# Patient Record
Sex: FEMALE | Race: WHITE | Employment: FULL TIME | URBAN - METROPOLITAN AREA
[De-identification: names, ages, dates, MRNs, and addresses within clinical notes are randomized per-mention and may not be internally consistent; named-entity substitution may affect disease eponyms.]

---

## 2021-09-13 ENCOUNTER — APPOINTMENT (OUTPATIENT)
Dept: GENERAL RADIOLOGY | Age: 21
End: 2021-09-13
Attending: PHYSICIAN ASSISTANT

## 2021-09-13 ENCOUNTER — HOSPITAL ENCOUNTER (EMERGENCY)
Age: 21
Discharge: HOME | End: 2021-09-13
Attending: EMERGENCY MEDICINE

## 2021-09-13 VITALS
HEIGHT: 64 IN | TEMPERATURE: 98.7 F | SYSTOLIC BLOOD PRESSURE: 123 MMHG | RESPIRATION RATE: 15 BRPM | OXYGEN SATURATION: 100 % | BODY MASS INDEX: 18.78 KG/M2 | WEIGHT: 110 LBS | DIASTOLIC BLOOD PRESSURE: 88 MMHG | HEART RATE: 80 BPM

## 2021-09-13 DIAGNOSIS — R07.9 CHEST PAIN, UNSPECIFIED TYPE: ICD-10-CM

## 2021-09-13 DIAGNOSIS — R07.89 CHEST WALL PAIN: ICD-10-CM

## 2021-09-13 DIAGNOSIS — N64.4 BREAST PAIN: Primary | ICD-10-CM

## 2021-09-13 LAB — HCG UR QL: NEGATIVE

## 2021-09-13 PROCEDURE — 71046 X-RAY EXAM CHEST 2 VIEWS: CPT

## 2021-09-13 PROCEDURE — 81025 URINE PREGNANCY TEST: CPT

## 2021-09-13 PROCEDURE — 93005 ELECTROCARDIOGRAM TRACING: CPT | Performed by: PHYSICIAN ASSISTANT

## 2021-09-13 PROCEDURE — 99283 EMERGENCY DEPT VISIT LOW MDM: CPT

## 2021-09-13 RX ORDER — NAPROXEN SODIUM 550 MG/1
550 TABLET ORAL 2 TIMES DAILY WITH MEALS
Qty: 20 TABLET | Refills: 0 | Status: SHIPPED | OUTPATIENT
Start: 2021-09-13 | End: 2021-09-23

## 2021-09-13 RX ORDER — NORETHINDRONE ACETATE AND ETHINYL ESTRADIOL 1.5-30(21)
1 KIT ORAL DAILY
COMMUNITY

## 2021-09-13 NOTE — ED TRIAGE NOTES
Pt states she has a dull chest pain med chest on sternum and that her breast are sore/  Pt states it feels like pressure

## 2021-09-13 NOTE — ED PROVIDER NOTES
48-year-old female who is otherwise healthy comes in with concern for chest pain for the past several days. Patient reports she has also had bilateral breast pain for about 2 or 3 weeks but developed pain in the middle of her chest over the past couple days. She has no cough or shortness of breath. Reports the pain feels like a pressure in the middle of her sternum and there are no alleviating or exacerbating factors. She denies fevers, hemoptysis or leg swelling. History reviewed. No pertinent past medical history. No past surgical history on file. History reviewed. No pertinent family history. Social History     Socioeconomic History    Marital status: SINGLE     Spouse name: Not on file    Number of children: Not on file    Years of education: Not on file    Highest education level: Not on file   Occupational History    Not on file   Tobacco Use    Smoking status: Not on file   Substance and Sexual Activity    Alcohol use: Not on file    Drug use: Not on file    Sexual activity: Not on file   Other Topics Concern    Not on file   Social History Narrative    Not on file     Social Determinants of Health     Financial Resource Strain:     Difficulty of Paying Living Expenses:    Food Insecurity:     Worried About Running Out of Food in the Last Year:     [address] in the Last Year:    Transportation Needs:     Lack of Transportation (Medical):      Lack of Transportation (Non-Medical):    Physical Activity:     Days of Exercise per Week:     Minutes of Exercise per Session:    Stress:     Feeling of Stress :    Social Connections:     Frequency of Communication with Friends and Family:     Frequency of Social Gatherings with Friends and Family:     Attends [employer]ian[employer] Services:     Active Member of Clubs or Organizations:     Attends Club or Organization Meetings:     Marital Status:    Intimate Partner Violence:     Fear of Current or Ex-Partner:     Emotionally Abused:     Physically Abused:     Sexually Abused: ALLERGIES: Patient has no known allergies. Review of Systems   Constitutional: Negative for chills and fever. Respiratory: Negative for cough and shortness of breath. Cardiovascular: Positive for chest pain. Negative for palpitations and leg swelling. Gastrointestinal: Negative for abdominal pain, nausea and vomiting. Genitourinary: Negative for dysuria, frequency and pelvic pain. Skin: Negative for color change. All other systems reviewed and are negative. Vitals:    09/13/21 1648   BP: 123/88   Pulse: 80   Resp: 15   Temp: 98.7 °F (37.1 °C)   SpO2: 100%   Weight: 49.9 kg (110 lb)   Height: 5' 4\" (1.626 m)            Physical Exam  Vitals and nursing note reviewed. Exam conducted with a chaperone present. Constitutional:       General: She is not in acute distress. Appearance: Normal appearance. She is not ill-appearing, toxic-appearing or diaphoretic. HENT:      Head: Normocephalic and atraumatic. Eyes:      Conjunctiva/sclera: Conjunctivae normal.   Cardiovascular:      Rate and Rhythm: Normal rate and regular rhythm. Pulses: Normal pulses. Pulmonary:      Effort: Pulmonary effort is normal. No respiratory distress. Breath sounds: Normal air entry. No stridor, decreased air movement or transmitted upper airway sounds. No decreased breath sounds, wheezing or rhonchi. Chest:      Chest wall: Tenderness present. No mass, lacerations, deformity, swelling, crepitus or edema. There is no dullness to percussion. Breasts:         Right: Tenderness present. No swelling, bleeding, inverted nipple, mass, nipple discharge or skin change. Left: Tenderness present. No swelling, bleeding, inverted nipple, mass, nipple discharge or skin change.       Comments: Sternum mildly tender to palpation, bilateral breasts are mildly tender without obvious mass or skin dimpling or changes or erythema or increased warmth or obvious abnormalities  Lymphadenopathy:      Upper Body:      Right upper body: No supraclavicular, axillary or pectoral adenopathy. Left upper body: No supraclavicular, axillary or pectoral adenopathy. Skin:     General: Skin is warm and dry. Neurological:      General: No focal deficit present. Mental Status: She is alert and oriented to person, place, and time. Mental status is at baseline. MDM  Number of Diagnoses or Management Options  Breast pain: new and requires workup  Chest pain, unspecified type: new and requires workup  Chest wall pain: new and requires workup  Diagnosis management comments: Patient with bilateral breast pain and reproducible chest pain. Examination shows no obvious deformity or abnormality or mass. Mild tenderness noted on exam of bilateral breast and sternum. Chest x-ray and EKG obtained and were unremarkable. Patient advised to take Tylenol and ibuprofen as needed for pain. Recommend close follow-up with PCP as she may require breast ultrasound. She does report recently started taking birth control 5 weeks ago but she has been on the same exact birth control at the same dose and same brand in the past and never had breast tenderness. Will defer any changes in OCP to patient's PCP. Urine hCG was also obtained and was negative. EKG    Date/Time: 9/14/2021 11:09 PM  Performed by: REGAN Oviedo  Authorized by: REGAN Oviedo     Comments:      EKG shows sinus rhythm, rate of 64 bpm, normal axis, normal intervals, no acute ST or T wave changes.

## 2021-09-13 NOTE — DISCHARGE INSTRUCTIONS
Please follow-up with the PCP or OB/GYN. You may need to get an outpatient ultrasound or mammogram depending on what the primary doctor decides. Please return if you develop any severely worsening or emergent symptoms. In the meantime, you may take Tylenol every 4-6 hours and naproxen every 12 hours as needed for pain.

## 2021-09-14 LAB
ATRIAL RATE: 64 BPM
CALCULATED P AXIS, ECG09: 10 DEGREES
CALCULATED R AXIS, ECG10: 19 DEGREES
CALCULATED T AXIS, ECG11: -10 DEGREES
DIAGNOSIS, 93000: NORMAL
P-R INTERVAL, ECG05: 142 MS
Q-T INTERVAL, ECG07: 396 MS
QRS DURATION, ECG06: 78 MS
QTC CALCULATION (BEZET), ECG08: 408 MS
VENTRICULAR RATE, ECG03: 64 BPM

## 2021-09-14 NOTE — ED NOTES
Pt presents to the ED for c/o right breast pain for several days.   States that she just recently restarted her birth control pills and thinks that this may be the cause

## 2021-09-14 NOTE — ED NOTES
I have reviewed discharge instructions with the patient. The patient verbalized understanding. Patient left ED via Discharge Method: ambulatory to Home with , self). Opportunity for questions and clarification provided. Patient given 1 scripts. To continue your aftercare when you leave the hospital, you may receive an automated call from our care team to check in on how you are doing. This is a free service and part of our promise to provide the best care and service to meet your aftercare needs.  If you have questions, or wish to unsubscribe from this service please call [phone]. Thank you for Choosing our [facility] Department.